# Patient Record
Sex: MALE | Race: WHITE | URBAN - METROPOLITAN AREA
[De-identification: names, ages, dates, MRNs, and addresses within clinical notes are randomized per-mention and may not be internally consistent; named-entity substitution may affect disease eponyms.]

---
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
## 2019-09-12 PROCEDURE — 86706 HEP B SURFACE ANTIBODY: CPT | Performed by: FAMILY MEDICINE

## 2019-10-29 ENCOUNTER — APPOINTMENT (OUTPATIENT)
Dept: OTHER | Facility: HOSPITAL | Age: 19
End: 2019-10-29
Attending: ORTHOPAEDIC SURGERY

## 2022-01-25 ENCOUNTER — APPOINTMENT (OUTPATIENT)
Dept: OTHER | Facility: HOSPITAL | Age: 22
End: 2022-01-25
Attending: PREVENTIVE MEDICINE

## 2024-01-12 DIAGNOSIS — Z02.1 PHYSICAL EXAM, PRE-EMPLOYMENT: Primary | ICD-10-CM

## 2024-01-17 ENCOUNTER — HOSPITAL ENCOUNTER (OUTPATIENT)
Dept: CV DIAGNOSTICS | Facility: HOSPITAL | Age: 24
Discharge: HOME | End: 2024-01-17
Attending: PREVENTIVE MEDICINE

## 2024-01-17 ENCOUNTER — OFFICE VISIT (OUTPATIENT)
Dept: OTHER | Facility: HOSPITAL | Age: 24
End: 2024-01-17
Attending: PREVENTIVE MEDICINE

## 2024-01-17 DIAGNOSIS — Z02.1 PRE-EMPLOYMENT EXAMINATION: Primary | ICD-10-CM

## 2024-01-17 DIAGNOSIS — Z02.1 PHYSICAL EXAM, PRE-EMPLOYMENT: ICD-10-CM

## 2024-01-17 LAB
ALBUMIN SERPL-MCNC: 4.3 G/DL (ref 3.4–5)
ALP LIVER SERPL-CCNC: 57 U/L
ALT SERPL-CCNC: 76 U/L
AST SERPL-CCNC: 33 U/L (ref 15–37)
BASOPHILS # BLD AUTO: 0.03 X10(3) UL (ref 0–0.2)
BASOPHILS NFR BLD AUTO: 0.6 %
BILIRUB SERPL-MCNC: 1 MG/DL (ref 0.1–2)
BILIRUB UR QL STRIP.AUTO: NEGATIVE
BUN BLD-MCNC: 17 MG/DL (ref 9–23)
CALCIUM BLD-MCNC: 9 MG/DL (ref 8.5–10.1)
CHLORIDE SERPL-SCNC: 108 MMOL/L (ref 98–112)
CHOLEST SERPL-MCNC: 172 MG/DL (ref ?–200)
CLARITY UR REFRACT.AUTO: CLEAR
CO2 SERPL-SCNC: 32 MMOL/L (ref 21–32)
CREAT BLD-MCNC: 0.96 MG/DL
EGFRCR SERPLBLD CKD-EPI 2021: 112 ML/MIN/1.73M2 (ref 60–?)
EOSINOPHIL # BLD AUTO: 0.05 X10(3) UL (ref 0–0.7)
EOSINOPHIL NFR BLD AUTO: 1.1 %
ERYTHROCYTE [DISTWIDTH] IN BLOOD BY AUTOMATED COUNT: 11.4 %
GGT SERPL-CCNC: 16 U/L
GLUCOSE BLD-MCNC: 91 MG/DL (ref 70–99)
GLUCOSE UR STRIP.AUTO-MCNC: NORMAL MG/DL
HCT VFR BLD AUTO: 47 %
HDLC SERPL-MCNC: 51 MG/DL (ref 40–59)
HGB BLD-MCNC: 16.3 G/DL
IMM GRANULOCYTES # BLD AUTO: 0.01 X10(3) UL (ref 0–1)
IMM GRANULOCYTES NFR BLD: 0.2 %
IRON SERPL-MCNC: 132 UG/DL
KETONES UR STRIP.AUTO-MCNC: NEGATIVE MG/DL
LDH SERPL L TO P-CCNC: 175 U/L
LDLC SERPL CALC-MCNC: 107 MG/DL (ref ?–100)
LEUKOCYTE ESTERASE UR QL STRIP.AUTO: NEGATIVE
LYMPHOCYTES # BLD AUTO: 1.49 X10(3) UL (ref 1–4)
LYMPHOCYTES NFR BLD AUTO: 32 %
MAGNESIUM SERPL-MCNC: 2.2 MG/DL (ref 1.6–2.6)
MCH RBC QN AUTO: 31.5 PG (ref 26–34)
MCHC RBC AUTO-ENTMCNC: 34.7 G/DL (ref 31–37)
MCV RBC AUTO: 90.9 FL
MONOCYTES # BLD AUTO: 0.41 X10(3) UL (ref 0.1–1)
MONOCYTES NFR BLD AUTO: 8.8 %
NEUTROPHILS # BLD AUTO: 2.67 X10 (3) UL (ref 1.5–7.7)
NEUTROPHILS # BLD AUTO: 2.67 X10(3) UL (ref 1.5–7.7)
NEUTROPHILS NFR BLD AUTO: 57.3 %
NITRITE UR QL STRIP.AUTO: NEGATIVE
NONHDLC SERPL-MCNC: 121 MG/DL (ref ?–130)
PH UR STRIP.AUTO: 5 [PH] (ref 5–8)
PHOSPHATE SERPL-MCNC: 3.1 MG/DL (ref 2.5–4.9)
PLATELET # BLD AUTO: 153 10(3)UL (ref 150–450)
POTASSIUM SERPL-SCNC: 3.8 MMOL/L (ref 3.5–5.1)
PROT SERPL-MCNC: 7.5 G/DL (ref 6.4–8.2)
PROT UR STRIP.AUTO-MCNC: NEGATIVE MG/DL
RBC # BLD AUTO: 5.17 X10(6)UL
RBC UR QL AUTO: NEGATIVE
SODIUM SERPL-SCNC: 142 MMOL/L (ref 136–145)
SP GR UR STRIP.AUTO: 1.02 (ref 1–1.03)
TRIGL SERPL-MCNC: 76 MG/DL (ref 30–149)
URATE SERPL-MCNC: 5.5 MG/DL
UROBILINOGEN UR STRIP.AUTO-MCNC: NORMAL MG/DL
VLDLC SERPL CALC-MCNC: 13 MG/DL (ref 0–30)
WBC # BLD AUTO: 4.7 X10(3) UL (ref 4–11)

## 2024-01-17 PROCEDURE — 86480 TB TEST CELL IMMUN MEASURE: CPT

## 2024-01-17 PROCEDURE — 80053 COMPREHEN METABOLIC PANEL: CPT

## 2024-01-17 PROCEDURE — 81003 URINALYSIS AUTO W/O SCOPE: CPT

## 2024-01-17 PROCEDURE — 85025 COMPLETE CBC W/AUTO DIFF WBC: CPT

## 2024-01-17 PROCEDURE — 80061 LIPID PANEL: CPT

## 2024-01-19 LAB
M TB IFN-G CD4+ T-CELLS BLD-ACNC: 0 IU/ML
M TB TUBERC IFN-G BLD QL: NEGATIVE
M TB TUBERC IGNF/MITOGEN IGNF CONTROL: >10 IU/ML
QFT TB1 AG MINUS NIL: 0 IU/ML
QFT TB2 AG MINUS NIL: 0 IU/ML

## 2024-01-22 ENCOUNTER — OFFICE VISIT (OUTPATIENT)
Dept: OTHER | Facility: HOSPITAL | Age: 24
End: 2024-01-22
Attending: PREVENTIVE MEDICINE

## 2024-01-22 ENCOUNTER — HOSPITAL ENCOUNTER (OUTPATIENT)
Dept: GENERAL RADIOLOGY | Facility: HOSPITAL | Age: 24
Discharge: HOME | End: 2024-01-22
Attending: PREVENTIVE MEDICINE

## 2024-01-22 DIAGNOSIS — Z02.1 PHYSICAL EXAM, PRE-EMPLOYMENT: ICD-10-CM

## 2024-01-22 DIAGNOSIS — Z02.1 PHYSICAL EXAM, PRE-EMPLOYMENT: Primary | ICD-10-CM

## 2024-12-09 ENCOUNTER — OFFICE VISIT (OUTPATIENT)
Dept: OCCUPATIONAL MEDICINE | Age: 24
End: 2024-12-09
Attending: PHYSICIAN ASSISTANT

## 2024-12-09 ENCOUNTER — HOSPITAL ENCOUNTER (OUTPATIENT)
Dept: GENERAL RADIOLOGY | Age: 24
Discharge: HOME | End: 2024-12-09
Attending: PHYSICIAN ASSISTANT

## 2024-12-09 DIAGNOSIS — M24.811 INTERNAL DERANGEMENT OF RIGHT SHOULDER: ICD-10-CM

## 2024-12-09 DIAGNOSIS — S46.911A RIGHT SHOULDER STRAIN, INITIAL ENCOUNTER: Primary | ICD-10-CM

## 2024-12-09 PROCEDURE — 73030 X-RAY EXAM OF SHOULDER: CPT | Performed by: PHYSICIAN ASSISTANT

## 2024-12-09 RX ORDER — CYCLOBENZAPRINE HCL 10 MG
10 TABLET ORAL NIGHTLY
Qty: 20 TABLET | Refills: 0 | Status: SHIPPED | OUTPATIENT
Start: 2024-12-09 | End: 2024-12-29

## 2024-12-09 RX ORDER — IBUPROFEN 600 MG/1
600 TABLET, FILM COATED ORAL EVERY 6 HOURS PRN
Qty: 30 TABLET | Refills: 0 | Status: SHIPPED | OUTPATIENT
Start: 2024-12-09

## 2024-12-17 ENCOUNTER — HOSPITAL ENCOUNTER (OUTPATIENT)
Dept: MRI IMAGING | Facility: HOSPITAL | Age: 24
Discharge: HOME | End: 2024-12-17
Attending: PHYSICIAN ASSISTANT
Payer: OTHER MISCELLANEOUS

## 2024-12-17 DIAGNOSIS — M24.811 INTERNAL DERANGEMENT OF RIGHT SHOULDER: ICD-10-CM

## 2024-12-17 PROCEDURE — 73221 MRI JOINT UPR EXTREM W/O DYE: CPT | Performed by: PHYSICIAN ASSISTANT

## 2024-12-20 ENCOUNTER — OFFICE VISIT (OUTPATIENT)
Dept: OCCUPATIONAL MEDICINE | Age: 24
End: 2024-12-20
Attending: PHYSICIAN ASSISTANT

## 2024-12-20 DIAGNOSIS — S43.421D SPRAIN OF RIGHT ROTATOR CUFF CAPSULE, SUBSEQUENT ENCOUNTER: Primary | ICD-10-CM

## 2024-12-26 ENCOUNTER — OFFICE VISIT (OUTPATIENT)
Dept: PHYSICAL THERAPY | Age: 24
End: 2024-12-26
Attending: FAMILY MEDICINE
Payer: OTHER MISCELLANEOUS

## 2024-12-26 ENCOUNTER — TELEPHONE (OUTPATIENT)
Dept: PHYSICAL THERAPY | Facility: HOSPITAL | Age: 24
End: 2024-12-26

## 2024-12-26 DIAGNOSIS — S43.421D SPRAIN OF RIGHT ROTATOR CUFF CAPSULE, SUBSEQUENT ENCOUNTER: Primary | ICD-10-CM

## 2024-12-26 PROCEDURE — 97110 THERAPEUTIC EXERCISES: CPT | Performed by: PHYSICAL THERAPIST

## 2024-12-26 PROCEDURE — 97161 PT EVAL LOW COMPLEX 20 MIN: CPT | Performed by: PHYSICAL THERAPIST

## 2024-12-26 NOTE — PROGRESS NOTES
SHOULDER EVALUATION:     Diagnosis:   Sprain of right rotator cuff capsule, subsequent encounter (S44.007Y)       Referring Provider: Forrest Diaz  Date of Evaluation:    12/26/2024    Precautions:  None Next MD visit:   1/5/2025  Date of Surgery: n/a     PATIENT SUMMARY   Randal Moore is a 24 year old male who presents to therapy today with complaints of resolving right shoulder pain.  He initially  felt   \"pop\" in  his shoulder when he was putting up a ladder on roof during training with the [employer]. He did not notice pain with the \"pop\".  Pain was noted when he went to start a [id number].  The injury took place 11/24/2024. Pain was so bad he could hardly reach his arm overhead.  He is getting better.     He feels he is at 75-80%. He is right hand dominant. He denies neck pain. Denies numbness/tingling.   Pt describes pain level current 0/10, at best 0/10, at worst 6/10.   Current functional limitations include light duty at work performing administrative tasks.   No upper body weight training. Putting on and taking off his shirt was difficult but has improved.  Difficulty with sleeping and lifting overhead. He tried bicep curl - no problems       Randal describes prior level of function as independent with ADLS. 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  He  Pt goals include improve pain and return to work .  Past medical history was reviewed with Randal. Significant findings include : asthma      ASSESSMENT  Randal presents to physical therapy evaluation with primary c/o resolving right shoulder pain. The results of the objective tests and measures show Mild right shoulder pain end ROM and with scap muscle group testing. No pain with carrying, no pain with push ups.  Functional deficits include but are not limited to off of work, some difficulty reaching behind back.  Signs and symptoms are consistent with diagnosis of shoulder strain. Pt and PT discussed evaluation findings,  pathology, POC and HEP.  Pt voiced understanding and performs HEP correctly without reported pain. Skilled Physical Therapy is medically necessary to address the above impairments and reach functional goals.     OBJECTIVE:   Observation/Posture: Good overall muscle development.   Palpation: No pain   Cervical Screen: negative     AROM: (* denotes performed with pain)  Full ROM flex/abd/ER/IR - mild pain at end ROM flexion   Apleys' IR - right shoulder T/L junction - mild pain end ROM   Left UE full ROM - no pain     Accessory motion: No pain with A/P/lat glides right G/H joint         Strength/MMT: (* denotes performed with pain)  Shoulder Elbow Scapular   Flexion: R 5/5; L 5/5  Abduction: R 5/5; L 5/5  ER: R 5/5; L 5/5  IR: R 5/5; L 5/5 Flexion: R 5/5; L 5/5  Extension: R 5/5; L 5/5  Supination: R 5/5; L 5/5  Pronation: R 5/5; L 5/5  Rhomboids: R5/5, L 5/5  Mid trap: R 5/5*; L 5/5   Lats: R 5/5, L 5/5  Low trap: R 4/5*; L 4/5   : right = 105, left = 100 pounds   Push ups - no pain   Minimum of 40 pound carry > 60 feet - no pain         Special tests:   Negative. End range  motion painful as previously noted - mild pain with impingement screen     Today’s Treatment and Response:   Pt education was provided on exam findings, treatment diagnosis, treatment plan, expectations, and prognosis. Treatment session included contract/relax techniques, ROM right shoulder and HEP instruction. Mild pain with AROM right shoulder was essentially resolved after the visit. Patient instructed in band exercise to be performed at pain free level. Black and blue band issued. Patient was appreciative of the assistance with his care and reported decreased pain post exercise.   Patient was instructed in and issued a HEP for: horizontal abduction with band, base position ER, wall push ups, prone MT      Charges: PT Eval Low Complexity, TherEx      Total Timed Treatment: 15 min     Total Treatment Time: 50 min     Based on clinical  rationale and outcome measures, this evaluation involved Low Complexity decision making   PLAN OF CARE:    Goals: (to be met in 4-6 visits)   No difficulty opening jars   No difficulty reaching overhead and behind back with ADLS   Right shoulder end ROM - no pain  No pain with MT exercises  Patient will be able to lift minimum of 20 pounds overhead with no pain     Frequency / Duration: Patient will be seen for 1-2 x/week or a total of 4-6 visits over a 90 day period. Treatment will include: Therapeutic Exercise    Education or treatment limitation: None  Rehab Potential:good    QuickDASH Outcome Score  Score: (Patient-Rptd) 27.27 % (12/26/2024 12:24 PM)      Patient/Family/Caregiver was advised of these findings, precautions, and treatment options and has agreed to actively participate in planning and for this course of care.    Thank you for your referral. Please co-sign or sign and return this letter via fax as soon as possible to [phone]. If you have any questions, please contact me at Dept: [phone]    Sincerely,  Electronically signed by therapist: Deepthi Jorgensen PT  Physician's certification required: Yes  I certify the need for these services furnished under this plan of treatment and while under my care.    X___________________________________________________ Date____________________    Certification From: 12/26/2024  To:3/26/2025

## 2024-12-30 ENCOUNTER — OFFICE VISIT (OUTPATIENT)
Dept: PHYSICAL THERAPY | Age: 24
End: 2024-12-30
Attending: FAMILY MEDICINE
Payer: OTHER MISCELLANEOUS

## 2024-12-30 PROCEDURE — 97110 THERAPEUTIC EXERCISES: CPT | Performed by: PHYSICAL THERAPIST

## 2024-12-30 PROCEDURE — 97140 MANUAL THERAPY 1/> REGIONS: CPT | Performed by: PHYSICAL THERAPIST

## 2024-12-30 NOTE — PROGRESS NOTES
Dx: prain of right rotator cuff capsule, subsequent encounter (S47.421D)            Authorized # of Visits:  4  Fall Risk: standard         Precautions: n/a             Subjective:   Some soreness with exercises. Currently, no pain   Current Pain Ratin/10  Objective:   Mild pain at end ROM flex/abd. ROM is WNL   MT exercise - no pain    Overhead lifting - mild pain mid range       Assessment:   Patient is doing well in terms of ROM and loading below end ROM. No pain at rest, no pain at completion of visit with exception of end ROM fle/abd     Plan:   PT 2 more visits prior to follow up with physician     Date: 2024  Tx#: / Date:   Tx#: 3/ Date:   Tx#: 4/ Date:   Tx#: 5/ Date:   Tx#: 6/ Date:   Tx#: 7/ Date:   Tx#: 8/   TherEx 30 min TherEx TherEx TherEx TherEx TherEx TherEx   UBE L2 6 minutes          TRX overhead bilat shoulder flexion 10 reps          TRX retraction 20 reps x 2          Farmer's carry 25, 20 pounds > 8- feet, 30, 40  > 80 feet          SL ER 4 pounds 10 reps x 3          Prone MT 10 reps x 3 with 2 second hold          Cw/ccw 15 reps each          Cable chop 96 pounds 15 reps each          Diagonal lift 12 and 18 pound bar 15 reps each direction with each load, mild pain mid range          Manual therapy 15 min  Inf/lat glide, long axis distraction   Rhythmic stabilization at 90 flex/abd and end range distal hand placement. Pain at end range flexion only              Charges: TherEx 2, manual therapy        Total Timed Treatment: 45 min  Total Treatment Time: 50 min

## 2024-12-31 ENCOUNTER — TELEPHONE (OUTPATIENT)
Dept: PHYSICAL THERAPY | Facility: HOSPITAL | Age: 24
End: 2024-12-31

## 2024-12-31 ENCOUNTER — OFFICE VISIT (OUTPATIENT)
Dept: PHYSICAL THERAPY | Age: 24
End: 2024-12-31
Attending: FAMILY MEDICINE
Payer: OTHER MISCELLANEOUS

## 2024-12-31 PROCEDURE — 97110 THERAPEUTIC EXERCISES: CPT | Performed by: PHYSICAL THERAPIST

## 2024-12-31 NOTE — PROGRESS NOTES
Dx: prain of right rotator cuff capsule, subsequent encounter (S43.421D)            Authorized # of Visits:  4  Fall Risk: standard         Precautions: n/a             Subjective:   There was some soreness in his shoulder when he got home yesterday. No pain today.   Current Pain Ratin/10  Objective:   Mild pain with initial reps of overhead press in standing 18 pounds and with shoulder taps, repeated reps - pain resolved   No pain with pulling, no pain with 10 pound medicine ball slam     Assessment:   Transient episodes of pain as noted. Discussed progress with patient. He would like to attempt return to full duty.     Plan:   Re-eval including overhead lifting     Date: 2024  Tx#: 2 Date: 2024   Tx#: 3/4 Date:   Tx#: 4/ Date:   Tx#: 5/ Date:   Tx#: 6/ Date:   Tx#: 7/ Date:   Tx#: 8/   TherEx 30 min TherEx TherEx TherEx TherEx TherEx TherEx   UBE L2 6 minutes  UBE L2 6 minutes        TRX overhead bilat shoulder flexion 10 reps  TRX retraction 10 reps x 4         TRX retraction 20 reps x 2  Low pull 60 pounds 10 reps x 2         Farmer's carry 25, 20 pounds > 8- feet, 30, 40  > 80 feet  Shoulder ER blue band 90/90 10 reps x 3 - mild discomfort   Base position ER blue band 10 reps x 2         SL ER 4 pounds 10 reps x 3  Plank alternate shoulder taps   58 seconds - pain,  30 seconds - stopped at pain,  30 seconds - no pain at stop point         Prone MT 10 reps x 3 with 2 second hold  Pec stretch on foam roller  - right shoulder pain         Cw/ccw 15 reps each  18 pound bar overhead lift initial reps - mild pain  > 20 reps -no pain         Cable chop 96 pounds 15 reps each  10 pound medicine ball slams, rotation toss, one arm putt > 8 reps each         Diagonal lift 12 and 18 pound bar 15 reps each direction with each load, mild pain mid range  Chop 10 pound ball 10 reps x 2         Manual therapy 15 min  Inf/lat glide, long axis distraction   Rhythmic stabilization at 90 flex/abd and end range  distal hand placement. Pain at end range flexion only  Right shoulder lateral glide, contract/relax             Charges: TherEx 3      Total Timed Treatment: 45 min  Total Treatment Time: 45 min

## 2025-01-02 ENCOUNTER — OFFICE VISIT (OUTPATIENT)
Dept: PHYSICAL THERAPY | Age: 25
End: 2025-01-02
Attending: FAMILY MEDICINE
Payer: OTHER MISCELLANEOUS

## 2025-01-02 PROCEDURE — 97110 THERAPEUTIC EXERCISES: CPT | Performed by: PHYSICAL THERAPIST

## 2025-01-02 NOTE — PROGRESS NOTES
Dx: prain of right rotator cuff capsule, subsequent encounter (S43.421D)            Authorized # of Visits:  4  Fall Risk: standard         Precautions: n/a            Discharge Summary  Pt has attended 4 visits in Physical Therapy.    Subjective:   Patient reports he is 90%. He is ready to try returning to full duty. He did sneeze today and some pain as he rapidly raised his shoulder during th sneeze.   Current Pain Ratin/10  Objective:   Right shoulder AROM WNL - mild pain at end ROM at times and with pure frontal plane shoulder abduction. Scap plane abduction - no pain   Strength: 5/5 with MMT with exception of LT 4/5 - mild shoulder pain secondary to test position verus resistance.  Left/carry minimum of 40 pounds. Overhead press with minimum of 18 pound bar and manual over pressure - no pain. He was also able to perform pull ups - no pain.   He demonstrates 10 pound medicine ball slam and two arm toss - no pain           Assessment:   Patient is doing well.  When right shoulder pain occurs, it is related to end ROM versus shoulder loading.  Pure frontal plane shoulder abduction also provokes pain. No pain in scap plane. There was some question of potential cervical involvement as postural correction did impact pull up exercise.  Patient has not reported cervical pain.  Manual screen/cervical ROM did not provoke symptoms. Discussed progress with patient, he is ready to return to fully duty.  Based on current presentation, in agreement. Patient to discuss return with physician during follow up visit.       Goals: (to be met in 4-6 visits)   No difficulty opening jars - Met  No difficulty reaching overhead and behind back with ADLS - Met  Right shoulder end ROM - no pain - mild pain at times   No pain with MT exercises - Met  Patient will be able to lift minimum of 20 pounds overhead with no pain - Met as tested with 18 pound bar and over pressure     Post QuickDASH Outcome Score  Post Score: 6.82 % (2025   2:16 PM)    20.45 % improvement    Plan:   Discontinue PT      Thank you for your referral. If you have any questions, please contact me at Dept: [phone].    Sincerely,  Electronically signed by therapist: Deepthi Jorgensen, PT       Date: 12/30/2024  Tx#: 2/4 Date: 12/31/2024   Tx#: 3/4 Date: 1/2/2025   Tx#: 4/4 Date:   Tx#: 5/ Date:   Tx#: 6/ Date:   Tx#: 7/ Date:   Tx#: 8/   TherEx 30 min TherEx TherEx 45 min TherEx TherEx TherEx TherEx   UBE L2 6 minutes  UBE L2 6 minutes UBE L2 6 minutes        TRX overhead bilat shoulder flexion 10 reps  TRX retraction 10 reps x 4  Prone MT 20 reps     LT 10 reps        TRX retraction 20 reps x 2  Low pull 60 pounds 10 reps x 2  Lat puldown 216 > 20 reps        Beltre's carry 25, 20 pounds > 8- feet, 30, 40  > 80 feet  Shoulder ER blue band 90/90 10 reps x 3 - mild discomfort   Base position ER blue band 10 reps x 2  Pull up > 5 reps x 2        SL ER 4 pounds 10 reps x 3  Plank alternate shoulder taps   58 seconds - pain,  30 seconds - stopped at pain,  30 seconds - no pain at stop point  Bench press 18 pounds 10 reps, manual over pressure in addition to 18 pound bar - no pain    Incline press as above          Prone MT 10 reps x 3 with 2 second hold  Pec stretch on foam roller  - right shoulder pain  SL thoracic mobility        Cw/ccw 15 reps each  18 pound bar overhead lift initial reps - mild pain  > 20 reps -no pain  Right shoulder rhythmic stabilization, perturbations at 90 flexion in supine        Cable chop 96 pounds 15 reps each  10 pound medicine ball slams, rotation toss, one arm putt > 8 reps each  Right shoulder contract/relax, PROM        Diagonal lift 12 and 18 pound bar 15 reps each direction with each load, mild pain mid range  Chop 10 pound ball 10 reps x 2  Scap plane abduction minimum of 6 pounds - no pain. Pure frontal plane motion - mil pain at end ROM        Manual therapy 15 min  Inf/lat glide, long axis distraction   Rhythmic stabilization at 90 flex/abd  and end range distal hand placement. Pain at end range flexion only  Right shoulder lateral glide, contract/relax             Charges: TherEx 3      Total Timed Treatment: 45 min  Total Treatment Time: 45 min

## 2025-01-03 ENCOUNTER — OFFICE VISIT (OUTPATIENT)
Dept: OCCUPATIONAL MEDICINE | Age: 25
End: 2025-01-03
Attending: PHYSICIAN ASSISTANT

## 2025-01-06 ENCOUNTER — APPOINTMENT (OUTPATIENT)
Dept: PHYSICAL THERAPY | Age: 25
End: 2025-01-06
Attending: FAMILY MEDICINE
Payer: OTHER MISCELLANEOUS

## 2025-01-14 ENCOUNTER — TELEPHONE (OUTPATIENT)
Dept: PHYSICAL THERAPY | Facility: HOSPITAL | Age: 25
End: 2025-01-14

## 2025-01-14 ENCOUNTER — OFFICE VISIT (OUTPATIENT)
Dept: ORTHOPEDICS CLINIC | Facility: CLINIC | Age: 25
End: 2025-01-14
Payer: OTHER MISCELLANEOUS

## 2025-01-14 VITALS — HEIGHT: 67 IN | WEIGHT: 190 LBS | BODY MASS INDEX: 29.82 KG/M2

## 2025-01-14 DIAGNOSIS — S43.491A OTHER SPRAIN OF RIGHT SHOULDER JOINT, INITIAL ENCOUNTER: Primary | ICD-10-CM

## 2025-01-14 PROCEDURE — 99203 OFFICE O/P NEW LOW 30 MIN: CPT | Performed by: PHYSICIAN ASSISTANT

## 2025-01-14 NOTE — H&P
[facility] - ORTHOPEDICS  [address] [place] [id number]  [phone]     NEW PATIENT VISIT - HISTORY AND PHYSICAL EXAMINATION     Name: Randal Moore   MRN: [id number]  Date: 1/14/2025     CC: Right shoulder pain    REFERRED BY: Carloz Costa MD    HPI:   Randal Moore is a very pleasant 24 year old right-hand dominant male who presents today for evaluation, consultation, and management of right shoulder injury that occurred on November 24, 2024 after throwing a ladder at work.  He works as a  for [employer].  He initially felt a pop in his shoulder when he was putting a ladder onto roof during training.  He started to notice pain with other activities including using a chainsaw.  He was evaluated and it is [place] and x-ray and MRI was obtained.  He presents today for further management. No hx of work injury, or prior shoulder injury.   He has since felt symptoms resolved.     PMH:   History reviewed. No pertinent past medical history.    PAST SURGICAL HX:  History reviewed. No pertinent surgical history.    FAMILY HX:  History reviewed. No pertinent family history.    ALLERGIES:  Patient has no known allergies.    MEDICATIONS:   Current Outpatient Medications   Medication Sig Dispense Refill    ibuprofen 600 MG Oral Tab Take 1 tablet (600 mg total) by mouth every 6 (six) hours as needed for Pain. 30 tablet 0    azithromycin (ZITHROMAX Z-MAGAN) 250 MG Oral Tab Take two tablets today, then one tablet daily for 4 more days 6 tablet 0    Fexofenadine HCl (ALLEGRA OR) as needed.      CYCLOBENZAPRINE 10 MG Oral Tab Take 1 tablet (10 mg total) by mouth nightly for 20 days. 20 tablet 0       ROS: A comprehensive 14 point review of systems was performed and was negative aside from the aforementioned per history of present illness.    SOCIAL HX:  Social History     Occupational History    Not on file   Tobacco Use    Smoking status: Never    Smokeless  tobacco: Never   Substance and Sexual Activity    Alcohol use: No    Drug use: No    Sexual activity: Not on file        PE:   Vitals:    01/14/25 0827   Weight: 190 lb (86.2 kg)   Height: 5' 7\" (1.702 m)     Estimated body mass index is 29.76 kg/m² as calculated from the following:    Height as of this encounter: 5' 7\" (1.702 m).    Weight as of this encounter: 190 lb (86.2 kg).    Physical Exam  Constitutional:       Appearance: Normal appearance.   HENT:      Head: Normocephalic and atraumatic.   Eyes:      Extraocular Movements: Extraocular movements intact.   Neck:      Musculoskeletal: Normal range of motion and neck supple.   Cardiovascular:      Pulses: Normal pulses.   Pulmonary:      Effort: Pulmonary effort is normal. No respiratory distress.   Abdominal:      General: There is no distension.   Skin:     General: Skin is warm.      Capillary Refill: Capillary refill takes less than 2 seconds.      Findings: No bruising.   Neurological:      General: No focal deficit present.      Mental Status: Alert.   Psychiatric:         Mood and Affect: Mood normal.     Examination of the right shoulder demonstrates:     Skin is intact, warm and dry.   Cervical:  Full ROM  Spurling's  Negative    Deformity:   none  Atrophy:   none    Scapular winging: Negative    Palpation:     AC Joint  Negative  Biceps Tendon  Negative  Greater Tuberosity Negative    ROM:   Forward Flexion:  full and symmetric  Abduction:   full and symmetric  External Rotation:  full and symmetric  Internal Rotation:  full and symmetric    Rotator Cuff Strength:   Supraspinatus:   5/5  Subscapularis:   5/5  Infraspinatus/Teres: 5/5    Provocative Tests:   Bates:   Negative  Speed's:   Negative  [place]'s:   Negative  Lift-off:   Negative  Apprehension:  Negative  Sulcus Sign:   Negative    Neurovascular Upper Extremity (Bilateral)  Motor:    5/5 EPL, Finger Abduction, , Pinch, Deltoid  Sensation:   intact to light touch median, ulnar, radial  and axillary nerve  Circulation:   Normal, 2+ radial pulse    The contralateral upper extremity is without limitation in range of motion or strength, no positive provocative maneuvers.     Radiographic Examination/Diagnostics:    I personally viewed, independently interpreted and radiology report was reviewed.      MRI SHOULDER, RIGHT (CPT=73221)    Result Date: 12/18/2024  PROCEDURE:  MRI SHOULDER, RIGHT (CPT=73221)  COMPARISON:  [facility], XR, XR SHOULDER, COMPLETE (MIN 2 VIEWS), RIGHT (CPT=73030), 12/09/2024, 12:47 PM.  INDICATIONS:  M24.811 Internal derangement of right shoulder  TECHNIQUE:  Multiplanar imaging of the shoulder including oblique coronal, axial and sagittal imaging was acquired including proton density fat suppression technique. Images were performed without intravenous gadolinium.  PATIENT STATED HISTORY: (As transcribed by Technologist)  Patient states that 2 weeks ago he was leaning a 35 foot ladder against a building and developed pain to the anterior and posterior aspect of his right shoulder. States there is essentially no improvement.    FINDINGS:  ROTATOR CUFF:  No significant tendinosis or evidence of tears.  No subacromial/subdeltoid bursitis.  MUSCULATURE:  No strain, edema, or atrophy.  AC JOINT/ACROMION:  No significant arthritis or acute injury.  Normal marrow and cortical signal. Type I acromion.  Normal subacromial space without evidence of subacromial impingement.  BICEPS/LABRAL COMPLEX:  Biceps tendon is intact without significant tendinosis.  The anchor and glenoid labrum are within normal limits without evidence of slap tear.  GLENOHUMERAL JOINT:  No significant arthritis or acute injury.  Normal marrow and cortical signal.  There is edematous signal along the posterior capsule of the glenohumeral joint posterior to the glenoid labrum.  The edema does extend to the superior aspect of the posterior band of the inferior glenohumeral ligament.            CONCLUSION:  There is a  grade 1 sprain of the posterior shoulder capsular ligaments involving the posterior band of the inferior glenohumeral ligament.   LOCATION:  [facility]   Dictated by (CST): Mateus Barber MD on 12/18/2024 at 9:43 AM     Finalized by (CST): Mateus Barber MD on 12/18/2024 at 9:50 AM         IMPRESSION: Randal Moore is a 24 year old Right hand dominant male with right shoulder sprain, resolved.     PLAN:   We had a detailed discussion outlining the etiology, anatomy, pathophysiology, and natural history of the patient's findings. Imaging was reviewed in detail and correlated to a 3-dimensional model of the patient's pathology.     The patient notes near complete resolution of symptoms, and return to full baseline function. The patient can follow up with our office as needed. The patient had the opportunity to ask questions, and all questions were answered appropriately.     FOLLOW-UP:   Return to clinic on an as needed basis.           Kimo Marmolejo, [facility], PA-C Orthopedic Surgery / Sports Medicine Specialist  [facility]  [address] [place] [address] [id number] [place] [email or web address]  [email or web address]  t: [phone]  o: [phone]  f: [phone]    This note was dictated using Dragon software.  While it was briefly proofread prior to completion, some grammatical, spelling, and word choice errors due to dictation may still occur.

## 2025-01-15 ENCOUNTER — APPOINTMENT (OUTPATIENT)
Dept: PHYSICAL THERAPY | Age: 25
End: 2025-01-15
Attending: FAMILY MEDICINE
Payer: OTHER MISCELLANEOUS

## 2025-01-21 ENCOUNTER — APPOINTMENT (OUTPATIENT)
Dept: PHYSICAL THERAPY | Age: 25
End: 2025-01-21
Attending: FAMILY MEDICINE
Payer: OTHER MISCELLANEOUS

## 2025-01-24 ENCOUNTER — APPOINTMENT (OUTPATIENT)
Dept: PHYSICAL THERAPY | Age: 25
End: 2025-01-24
Attending: FAMILY MEDICINE
Payer: OTHER MISCELLANEOUS

## 2025-01-27 ENCOUNTER — APPOINTMENT (OUTPATIENT)
Dept: PHYSICAL THERAPY | Age: 25
End: 2025-01-27
Attending: FAMILY MEDICINE
Payer: OTHER MISCELLANEOUS

## 2025-02-21 ENCOUNTER — OFFICE VISIT (OUTPATIENT)
Dept: OCCUPATIONAL MEDICINE | Age: 25
End: 2025-02-21
Attending: PHYSICIAN ASSISTANT

## 2025-02-21 DIAGNOSIS — Z02.89 ENCOUNTER FOR OCCUPATIONAL HEALTH EXAMINATION: Primary | ICD-10-CM

## 2025-02-21 LAB
BILIRUB UR QL STRIP.AUTO: NEGATIVE
CLARITY UR REFRACT.AUTO: CLEAR
COLOR UR AUTO: COLORLESS
GLUCOSE UR STRIP.AUTO-MCNC: NORMAL MG/DL
KETONES UR STRIP.AUTO-MCNC: NEGATIVE MG/DL
LEUKOCYTE ESTERASE UR QL STRIP.AUTO: NEGATIVE
NITRITE UR QL STRIP.AUTO: NEGATIVE
PH UR STRIP.AUTO: 7 [PH] (ref 5–8)
PROT UR STRIP.AUTO-MCNC: NEGATIVE MG/DL
RBC UR QL AUTO: NEGATIVE
SP GR UR STRIP.AUTO: 1 (ref 1–1.03)
UROBILINOGEN UR STRIP.AUTO-MCNC: NORMAL MG/DL

## 2025-02-21 PROCEDURE — 81003 URINALYSIS AUTO W/O SCOPE: CPT

## (undated) NOTE — LETTER
Date: 1/14/2025    Patient Name: Randal Moore          To Whom it may concern:    This letter has been written at the patient's request. The above patient was seen at [facility] for treatment of a medical condition.    The patient can return to work without restrictions. He is cleared for full duty work. Follow up as needed.         Sincerely,          Sincer KUSH Marmolejo, [facility], PA-C Orthopedic Surgery / Sports Medicine Specialist  [facility]  [address] [place] [address].[email or web address]  t: [phone]  o: [phone]  f: [phone]    This note was dictated using Dragon software.  While it was briefly proofread prior to completion, some grammatical, spelling, and word choice errors due to dictation may still occur.